# Patient Record
Sex: MALE | Race: WHITE | NOT HISPANIC OR LATINO | URBAN - METROPOLITAN AREA
[De-identification: names, ages, dates, MRNs, and addresses within clinical notes are randomized per-mention and may not be internally consistent; named-entity substitution may affect disease eponyms.]

---

## 2022-05-16 ENCOUNTER — HOSPITAL ENCOUNTER (EMERGENCY)
Facility: HOSPITAL | Age: 22
Discharge: HOME | End: 2022-05-16
Attending: EMERGENCY MEDICINE | Admitting: EMERGENCY MEDICINE

## 2022-05-16 VITALS
RESPIRATION RATE: 18 BRPM | WEIGHT: 144.62 LBS | TEMPERATURE: 98.1 F | SYSTOLIC BLOOD PRESSURE: 146 MMHG | DIASTOLIC BLOOD PRESSURE: 83 MMHG | HEIGHT: 71 IN | BODY MASS INDEX: 20.25 KG/M2 | HEART RATE: 85 BPM | OXYGEN SATURATION: 100 %

## 2022-05-16 DIAGNOSIS — K64.9 HEMORRHOIDS, UNSPECIFIED HEMORRHOID TYPE: Primary | ICD-10-CM

## 2022-05-16 PROCEDURE — 99283 EMERGENCY DEPT VISIT LOW MDM: CPT

## 2022-05-16 RX ORDER — HYDROCORTISONE ACETATE 25 MG/1
25 SUPPOSITORY RECTAL 2 TIMES DAILY
Qty: 12 EACH | Refills: 0 | Status: SHIPPED | OUTPATIENT
Start: 2022-05-16

## 2022-05-16 NOTE — ED PROVIDER NOTES
Subjective     History provided by:  Patient  Hemorrhoids  Location:  Rectal/anal  Quality:  Burning/bleeding  Severity:  Moderate  Onset quality:  Sudden  Duration:  1 week  Timing:  Constant  Progression:  Worsening  Chronicity:  New  Context:  Pt reports hemorrhoids, seen pediatrician last week and was prescribed a medication that he hasn't been able to . Has been using OTC Preparation H. Was told if he started to bleed he needed to go to the ER. Has been bleeding recently. 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  Relieved by:  Nothing  Worsened by:  Nothing  Ineffective treatments:  Preparation H  Associated symptoms: no abdominal pain, no chest pain, no congestion, no cough, no diarrhea, no ear pain, no fatigue, no fever, no headaches, no loss of consciousness, no myalgias, no nausea, no rash, no rhinorrhea, no shortness of breath, no sore throat, no vomiting and no wheezing        Review of Systems   Constitutional: Negative for chills, fatigue and fever.   HENT: Negative for congestion, ear pain, rhinorrhea and sore throat.    Eyes: Negative for pain.   Respiratory: Negative for cough, chest tightness, shortness of breath and wheezing.    Cardiovascular: Negative for chest pain.   Gastrointestinal: Positive for anal bleeding and hemorrhoids. Negative for abdominal pain, diarrhea, nausea and vomiting.   Genitourinary: Negative for flank pain and hematuria.   Musculoskeletal: Negative for joint swelling and myalgias.   Skin: Negative for pallor and rash.   Neurological: Negative for seizures, loss of consciousness and headaches.   All other systems reviewed and are negative.      History reviewed. No pertinent past medical history.    No Known Allergies    No past surgical history on file.    History reviewed. No pertinent family history.    Social History     Socioeconomic History   • Marital status: Single           Objective   Physical Exam  Vitals and nursing note  reviewed. Exam conducted with a chaperone present (Sheree CARLSON).   Constitutional:       General: He is not in acute distress.     Appearance: Normal appearance. He is not toxic-appearing.   HENT:      Head: Normocephalic and atraumatic.      Mouth/Throat:      Mouth: Mucous membranes are moist.   Eyes:      General: No scleral icterus.  Cardiovascular:      Rate and Rhythm: Normal rate and regular rhythm.      Pulses: Normal pulses.      Heart sounds: Normal heart sounds.   Pulmonary:      Effort: Pulmonary effort is normal. No respiratory distress.      Breath sounds: Normal breath sounds.   Abdominal:      General: Abdomen is flat.      Palpations: Abdomen is soft.      Tenderness: There is no abdominal tenderness.   Genitourinary:     Rectum: External hemorrhoid present.   Musculoskeletal:         General: Normal range of motion.      Cervical back: Normal range of motion and neck supple.   Skin:     General: Skin is warm and dry.   Neurological:      Mental Status: He is alert and oriented to person, place, and time. Mental status is at baseline.         Procedures           ED Course                                                 MDM  Number of Diagnoses or Management Options  Hemorrhoids, unspecified hemorrhoid type: established and worsening  Diagnosis management comments: I have spoken with the patient. I have explained the patient´s condition, diagnoses and treatment plan based on the information available to me at this time. I have answered the patient's questions and addressed any concerns. The patient has a good  understanding of the patient´s diagnosis, condition, and treatment plan as can be expected at this point. The vital signs have been stable. The patient´s condition is stable and appropriate for discharge from the emergency department.      The patient will pursue further outpatient evaluation with the primary care physician or other designated or consulting physician as outlined in the discharge  instructions. They are agreeable to this plan of care and follow-up instructions have been explained in detail. The patient has received these instructions in written format and have expressed an understanding of the discharge instructions. The patient is aware that any significant change in condition or worsening of symptoms should prompt an immediate return to this or the closest emergency department or call to 911.    Risk of Complications, Morbidity, and/or Mortality  Presenting problems: low  Diagnostic procedures: minimal  Management options: low    Patient Progress  Patient progress: stable      Final diagnoses:   Hemorrhoids, unspecified hemorrhoid type       ED Disposition  ED Disposition     ED Disposition   Discharge    Condition   Stable    Comment   --             Provider, No Known  [facility]  [place] [address]    In 3 days  As needed    Dave Limon MD  [address] [facility]    [facility] [address] [phone]      If symptoms worsen         Medication List      New Prescriptions    hydrocortisone 25 MG suppository  Commonly known as: ANUSOL-HC  Insert 1 suppository into the rectum 2 (Two) Times a Day.           Where to Get Your Medications      These medications were sent to [facility] [address] [place] [address], [place] - [address] - [phone] [facility] [phone] FX  [address], [facility] [address]    Phone: [phone]   · hydrocortisone 25 MG suppository          Harshad Aguilar, APRN  05/16/22 1707

## 2024-12-03 ENCOUNTER — APPOINTMENT (OUTPATIENT)
Dept: GENERAL RADIOLOGY | Facility: HOSPITAL | Age: 24
End: 2024-12-03

## 2024-12-03 ENCOUNTER — HOSPITAL ENCOUNTER (EMERGENCY)
Facility: HOSPITAL | Age: 24
Discharge: HOME | End: 2024-12-03
Attending: EMERGENCY MEDICINE | Admitting: EMERGENCY MEDICINE

## 2024-12-03 VITALS
RESPIRATION RATE: 16 BRPM | SYSTOLIC BLOOD PRESSURE: 145 MMHG | HEART RATE: 79 BPM | DIASTOLIC BLOOD PRESSURE: 82 MMHG | TEMPERATURE: 98.8 F | OXYGEN SATURATION: 99 %

## 2024-12-03 DIAGNOSIS — R22.41 LOCALIZED SWELLING OF RIGHT FOOT: Primary | ICD-10-CM

## 2024-12-03 PROCEDURE — 99283 EMERGENCY DEPT VISIT LOW MDM: CPT

## 2024-12-03 PROCEDURE — 73630 X-RAY EXAM OF FOOT: CPT

## 2024-12-03 NOTE — DISCHARGE INSTRUCTIONS
Your evaluated emergency department today.  Your x-ray does not show evidence of her fracture or dislocation.  You have significant swelling, you are placed in a walking boot, you can keep this on for comfort.  Alternate Tylenol and ibuprofen for pain, rest with the foot elevated.

## 2024-12-03 NOTE — Clinical Note
[facility] EMERGENCY ROOM  [address] [address]  Phone: [phone]  Fax: [phone]    Esdras Hope was seen and treated in our emergency department on 12/3/2024.  He may return to work on 12/07/2024.         Thank you for choosing [facility].    Jean-Claude Contreras MD

## 2024-12-03 NOTE — ED PROVIDER NOTES
Time: 6:14 PM EST  Date of encounter:  12/3/2024  Independent Historian/Clinical History and Information was obtained by:   Patient    History is limited by: N/A    Chief Complaint: Foot pain      History of Present Illness:  Patient is a 24 y.o. year old male who presents to the emergency department for evaluation of foot pain.  Patient reports that at work this morning he dropped a 65 pound prabhu his right foot.  Was wearing boots but does not think they were still toe boots.  Reports immediate pain and swelling but was able to work on the foot all day.  Has been ambulatory all day today.  Has bruising to the dorsal aspect of his foot and is concerned that it may be broken.  Has not take any medications, denies numbness or tingling.  Denies other injury.      Patient Care Team  Primary Care Provider: Provider, No Known    Past Medical History:     No Known Allergies  History reviewed. No pertinent past medical history.  History reviewed. No pertinent surgical history.  History reviewed. No pertinent family history.    Home Medications:  Prior to Admission medications    Medication Sig Start Date End Date Taking? Authorizing Provider   hydrocortisone (ANUSOL-HC) 25 MG suppository Insert 1 suppository into the rectum 2 (Two) Times a Day. 5/16/22   Harshad Aguilar, NELLIE        Social History:   Social History     Tobacco Use    Smoking status: Every Day     Current packs/day: 1.00     Types: Cigarettes    Smokeless tobacco: Never   Vaping Use    Vaping status: Never Used   Substance Use Topics    Alcohol use: Not Currently    Drug use: Yes     Types: Marijuana     Comment: daily         Review of Systems:  Review of Systems   Constitutional:  Negative for activity change, appetite change and fatigue.   HENT:  Negative for congestion and rhinorrhea.    Eyes:  Negative for visual disturbance.   Respiratory:  Negative for cough and shortness of breath.    Cardiovascular:  Negative for chest pain.    Gastrointestinal:  Negative for abdominal pain and nausea.   Musculoskeletal:  Positive for arthralgias and gait problem. Negative for back pain, joint swelling and neck stiffness.   Skin:  Positive for color change.   Neurological:  Positive for dizziness.   Hematological:  Does not bruise/bleed easily.   Psychiatric/Behavioral:  Negative for agitation.         Physical Exam:  /82 (BP Location: Left arm, Patient Position: Sitting)   Pulse 79   Temp 98.8 °F (37.1 °C) (Oral)   Resp 16   SpO2 99%     Physical Exam  Constitutional:       Appearance: Normal appearance.   HENT:      Head: Normocephalic.   Eyes:      Extraocular Movements: Extraocular movements intact.      Conjunctiva/sclera: Conjunctivae normal.   Pulmonary:      Effort: Pulmonary effort is normal.   Abdominal:      General: There is no distension.   Musculoskeletal:         General: Swelling and signs of injury present.      Comments: Edema to right foot.  Bruising noted to dorsal aspect of foot just before toes.  CMS intact.  Tenderness to palpation over dorsal aspect of foot and last 3 toes.   Skin:     General: Skin is warm.      Coloration: Skin is not cyanotic.      Findings: Bruising present.   Neurological:      Mental Status: He is alert and oriented to person, place, and time.   Psychiatric:         Attention and Perception: Attention and perception normal.         Mood and Affect: Mood normal.                  Procedures:  Procedures      Medical Decision Making:      Comorbidities that affect care:    None    External Notes reviewed:    None      The following orders were placed and all results were independently analyzed by me:  Orders Placed This Encounter   Procedures    [facility] [facility]  CAM Boot; Yes; Yes; Severe swelling and pain; Yes    XR Foot 3+ View Right    Obtain & Apply The Following- Lower extremity; Walking boot       Medications Given in the Emergency Department:  Medications - No data to display     ED  Course:    ED Course as of 12/03/24 1817   Tue Dec 03, 2024   1817 Offered pain medication to patient, he declines [AS]      ED Course User Index  [AS] Meme Brunner PA-C       Labs:    Lab Results (last 24 hours)       ** No results found for the last 24 hours. **             Imaging:    XR Foot 3+ View Right    Result Date: 12/3/2024  XR FOOT 3+ VW RIGHT Date of Exam: 12/3/2024 5:32 PM EST Indication: dropped 60 lbs on it Comparison: None available. Findings: There is no evidence of fracture or dislocation. No focal lesions identified. No erosions. Joint spaces are maintained. No focal soft tissue abnormalities identified.     Impression: No evidence of fracture or dislocation. Electronically Signed: Martin Smallwood DO  12/3/2024 5:50 PM EST  Workstation ID: [id number]       Differential Diagnosis and Discussion:    Orthopedic Injuries: Differential diagnosis includes but is not limited to fractures, soft tissue injuries, dislocations, contusions, ligamentous injuries, tendon injuries, nerve injuries, compartment syndrome, bursitis, and vascular injuries.    All X-rays impressions were independently interpreted by me.    MDM     Amount and/or Complexity of Data Reviewed  Tests in the radiology section of CPT®: ordered and reviewed    Risk of Complications, Morbidity, and/or Mortality  Presenting problems: low  Diagnostic procedures: low  Management options: low    Patient Progress  Patient progress: stable                       Patient Care Considerations:          Consultants/Shared Management Plan:    None    Social Determinants of Health:    Patient is independent, reliable, and has access to care.       Disposition and Care Coordination:    Discharged: The patient is suitable and stable for discharge with no need for consideration of admission.    I have explained the patient´s condition, diagnoses and treatment plan based on the information available to me at this time. I have answered questions and  addressed any concerns. The patient has a good  understanding of the patient´s diagnosis, condition, and treatment plan as can be expected at this point. The vital signs have been stable. The patient´s condition is stable and appropriate for discharge from the emergency department.      The patient will pursue further outpatient evaluation with the primary care physician or other designated or consulting physician as outlined in the discharge instructions. They are agreeable to this plan of care and follow-up instructions have been explained in detail. The patient has received these instructions in written format and has expressed an understanding of the discharge instructions. The patient is aware that any significant change in condition or worsening of symptoms should prompt an immediate return to this or the closest emergency department or call to 911.  I have explained discharge medications and the need for follow up with the patient/caretakers. This was also printed in the discharge instructions. Patient was discharged with the following medications and follow up:      Medication List      No changes were made to your prescriptions during this visit.      No follow-up provider specified.     Final diagnoses:   Localized swelling of right foot        ED Disposition       ED Disposition   Discharge    Condition   Stable    Comment   --               This medical record created using voice recognition software.             Meme Brunner PA-C  12/03/24 3265